# Patient Record
Sex: FEMALE | Race: WHITE | NOT HISPANIC OR LATINO | Employment: UNEMPLOYED | URBAN - METROPOLITAN AREA
[De-identification: names, ages, dates, MRNs, and addresses within clinical notes are randomized per-mention and may not be internally consistent; named-entity substitution may affect disease eponyms.]

---

## 2023-03-14 PROBLEM — J45.909 ASTHMA (HHS-HCC): Status: ACTIVE | Noted: 2023-03-14

## 2023-03-14 PROBLEM — R21 RASH: Status: ACTIVE | Noted: 2023-03-14

## 2023-03-14 PROBLEM — M67.40 GANGLION CYST: Status: ACTIVE | Noted: 2023-03-14

## 2023-03-14 PROBLEM — L03.90 CELLULITIS: Status: ACTIVE | Noted: 2023-03-14

## 2023-03-14 PROBLEM — M25.579 ANKLE PAIN: Status: ACTIVE | Noted: 2023-03-14

## 2023-03-14 PROBLEM — M25.579 ANKLE PAIN: Status: RESOLVED | Noted: 2023-03-14 | Resolved: 2023-03-14

## 2023-03-14 PROBLEM — H10.45 CHRONIC ALLERGIC CONJUNCTIVITIS: Status: ACTIVE | Noted: 2023-03-14

## 2023-03-14 PROBLEM — D23.9 DYSPLASTIC NEVI: Status: ACTIVE | Noted: 2023-03-14

## 2023-03-14 PROBLEM — F90.0 ATTENTION DEFICIT HYPERACTIVITY DISORDER (ADHD), PREDOMINANTLY INATTENTIVE TYPE: Status: ACTIVE | Noted: 2023-03-14

## 2023-03-14 PROBLEM — J30.9 ALLERGIC RHINITIS: Status: ACTIVE | Noted: 2023-03-14

## 2023-03-15 ENCOUNTER — OFFICE VISIT (OUTPATIENT)
Dept: PEDIATRICS | Facility: CLINIC | Age: 23
End: 2023-03-15
Payer: COMMERCIAL

## 2023-03-15 VITALS
BODY MASS INDEX: 26.51 KG/M2 | DIASTOLIC BLOOD PRESSURE: 72 MMHG | WEIGHT: 179 LBS | HEIGHT: 69 IN | SYSTOLIC BLOOD PRESSURE: 118 MMHG

## 2023-03-15 DIAGNOSIS — J40 BRONCHITIS: Primary | ICD-10-CM

## 2023-03-15 PROCEDURE — 99213 OFFICE O/P EST LOW 20 MIN: CPT | Performed by: PEDIATRICS

## 2023-03-15 RX ORDER — MUPIROCIN 20 MG/G
OINTMENT TOPICAL
COMMUNITY
Start: 2022-12-28

## 2023-03-15 RX ORDER — AZITHROMYCIN 250 MG/1
TABLET, FILM COATED ORAL
Qty: 6 TABLET | Refills: 0 | Status: SHIPPED | OUTPATIENT
Start: 2023-03-15 | End: 2023-03-20

## 2023-03-15 RX ORDER — ALBUTEROL SULFATE 90 UG/1
AEROSOL, METERED RESPIRATORY (INHALATION)
COMMUNITY
End: 2023-08-17 | Stop reason: WASHOUT

## 2023-03-15 RX ORDER — LISDEXAMFETAMINE DIMESYLATE 40 MG/1
40 CAPSULE ORAL
COMMUNITY
End: 2023-03-17 | Stop reason: SDUPTHER

## 2023-03-15 NOTE — PROGRESS NOTES
Had COVID 3 weeks ago. Feels like it is affecting her lungs. Coughing phlegm - green/brown. Taking mucinex.

## 2023-03-15 NOTE — PROGRESS NOTES
Subjective   Patient ID: Babak Fry is a 22 y.o. female who presents for Nasal Congestion and Cough.  HPI  Patient is here with self  She had covid three weeks ago. This was her second infection with covid. Since then she has been coughing up phelgm  There is no sore throat There is no ear pain There is no vomit nor diarrhea Urination is normal    Objective   Physical Exam  Constitutional:       Appearance: Normal appearance.   HENT:      Right Ear: Tympanic membrane normal.      Left Ear: Tympanic membrane normal.      Nose: Nose normal.      Mouth/Throat:      Mouth: Mucous membranes are moist.      Pharynx: Oropharynx is clear.   Cardiovascular:      Rate and Rhythm: Normal rate and regular rhythm.   Pulmonary:      Effort: Pulmonary effort is normal.      Breath sounds: Normal breath sounds.   Lymphadenopathy:      Cervical: No cervical adenopathy.   Skin:     General: Skin is warm.   Neurological:      Mental Status: She is alert.   Psychiatric:         Mood and Affect: Mood normal.         Assessment/Plan   Diagnoses and all orders for this visit:  Bronchitis  -     azithromycin (Zithromax) 250 mg tablet; Take 2 tablets (500 mg) by mouth once daily for 1 day, THEN 1 tablet (250 mg) once daily for 4 days.  You will take zithromax for five days. Please call back if you are not doing better by Monday   Return as needed

## 2023-03-17 ENCOUNTER — OFFICE VISIT (OUTPATIENT)
Dept: PEDIATRICS | Facility: CLINIC | Age: 23
End: 2023-03-17
Payer: COMMERCIAL

## 2023-03-17 VITALS
WEIGHT: 181 LBS | HEIGHT: 69 IN | SYSTOLIC BLOOD PRESSURE: 112 MMHG | BODY MASS INDEX: 26.81 KG/M2 | DIASTOLIC BLOOD PRESSURE: 78 MMHG

## 2023-03-17 DIAGNOSIS — F90.0 ATTENTION DEFICIT HYPERACTIVITY DISORDER (ADHD), PREDOMINANTLY INATTENTIVE TYPE: Primary | ICD-10-CM

## 2023-03-17 PROCEDURE — 99213 OFFICE O/P EST LOW 20 MIN: CPT | Performed by: PEDIATRICS

## 2023-03-17 RX ORDER — LISDEXAMFETAMINE DIMESYLATE 40 MG/1
40 CAPSULE ORAL EVERY MORNING
Qty: 30 CAPSULE | Refills: 0 | Status: SHIPPED | OUTPATIENT
Start: 2023-05-16 | End: 2023-06-30 | Stop reason: WASHOUT

## 2023-03-17 RX ORDER — LISDEXAMFETAMINE DIMESYLATE 40 MG/1
40 CAPSULE ORAL EVERY MORNING
Qty: 30 CAPSULE | Refills: 0 | Status: SHIPPED | OUTPATIENT
Start: 2023-04-16 | End: 2023-06-30 | Stop reason: WASHOUT

## 2023-03-17 RX ORDER — LISDEXAMFETAMINE DIMESYLATE 40 MG/1
40 CAPSULE ORAL
Qty: 30 CAPSULE | Refills: 0 | Status: SHIPPED | OUTPATIENT
Start: 2023-03-17 | End: 2023-06-30 | Stop reason: WASHOUT

## 2023-03-17 NOTE — PROGRESS NOTES
"Subjective   Patient ID: Babak Fry is a 22 y.o. female who presents for med check.  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  Working as  at the   [employer] - out from basketball for 1.5 years, decided she is now done, rather than playing 1 more year and possibly getting injured    Follow up ADHD med Vyvanse 40 mg  Takes med on school days or weekends if doing homework, otherwise no  Dose working well  Sleeping well 7-8 hrs  Appetite ok  Concerns - no    Controlled substance form on file 6/20/22  Had urine drug screen 12/22  Last well visit March 2022      Objective   Vitals:    03/17/23 0955   BP: 112/78   Weight: 82.1 kg (181 lb)   Height: 1.753 m (5' 9\")       Physical Exam  Constitutional:       General: She is not in acute distress.     Appearance: Normal appearance.   HENT:      Right Ear: Tympanic membrane normal.      Left Ear: Tympanic membrane normal.      Nose: Nose normal. No rhinorrhea.      Mouth/Throat:      Mouth: Mucous membranes are moist.      Pharynx: Oropharynx is clear. No posterior oropharyngeal erythema.   Eyes:      Conjunctiva/sclera: Conjunctivae normal.      Pupils: Pupils are equal, round, and reactive to light.   Cardiovascular:      Rate and Rhythm: Normal rate and regular rhythm.   Pulmonary:      Effort: Pulmonary effort is normal.      Breath sounds: Normal breath sounds.   Musculoskeletal:      Cervical back: Neck supple.   Lymphadenopathy:      Cervical: No cervical adenopathy.   Skin:     Findings: No rash.   Neurological:      Mental Status: She is alert.       Assessment/Plan   1. Attention deficit hyperactivity disorder (ADHD), predominantly inattentive type    - Vyvanse 40 mg capsule; Take 1 capsule (40 mg) by mouth once daily in the morning. Take before meals.  Dispense: 30 capsule; Refill: 0  - lisdexamfetamine (Vyvanse) 40 mg " capsule; Take 1 capsule (40 mg) by mouth once daily in the morning. Do not start before May 16, 2023.  Dispense: 30 capsule; Refill: 0  - lisdexamfetamine (Vyvanse) 40 mg capsule; Take 1 capsule (40 mg) by mouth once daily in the morning. Do not start before April 16, 2023.  Dispense: 30 capsule; Refill: 0     Chipia will schedule a well visit at her convenience, as she is due.

## 2023-06-29 DIAGNOSIS — F90.0 ATTENTION DEFICIT HYPERACTIVITY DISORDER (ADHD), PREDOMINANTLY INATTENTIVE TYPE: Primary | ICD-10-CM

## 2023-06-29 NOTE — TELEPHONE ENCOUNTER
from Babak, [phone].  Babak ran out of her medication and needs 3 rx's sent to the pharmacy.  Sees HA.

## 2023-06-29 NOTE — TELEPHONE ENCOUNTER
Last med check apt was 3/17/23; last wcc 3/1/22 w/HA - needs a wcc.      Spoke to Babak and she said she's on her way home from college now so we discussed that she does need a wcc.  Babak agrees to schedule an apt for that when we're done.  Said she takes vyvanse 40mg every day.  Per Babak, she has no side effects like h/a's, loss of appetite, weight loss or issues with sleeping.  Discussed that MORENO is back in the office tomorrow so will ask her to send rx then.  Babak understands plan and per DENNIS scheduled a wcc apt on 7/11/23.      I got 1 rx ready for you to send on Friday.  Then you can send the other 2 when you see her for her wcc.

## 2023-06-30 RX ORDER — LISDEXAMFETAMINE DIMESYLATE 40 MG/1
CAPSULE ORAL
Qty: 30 CAPSULE | Refills: 0 | Status: SHIPPED | OUTPATIENT
Start: 2023-06-30 | End: 2023-08-18 | Stop reason: WASHOUT

## 2023-08-15 ENCOUNTER — TELEPHONE (OUTPATIENT)
Dept: PEDIATRICS | Facility: CLINIC | Age: 23
End: 2023-08-15
Payer: COMMERCIAL

## 2023-08-15 NOTE — TELEPHONE ENCOUNTER
Last wcc 3/1/22 w/HA and last med check before the one that was missed on 7/11/23 was 3/17/23.  Left message on Babak's voice mail to call the  and schedule a wcc apt since she hasn't had one since last March.

## 2023-08-15 NOTE — TELEPHONE ENCOUNTER
from [facility], [phone].  She just realized she missed her apt last week and needs to schedule another before this Friday b/c she'll be going back to college and needs a refill of her medication.

## 2023-08-16 NOTE — TELEPHONE ENCOUNTER
Babak left another msg stating that she needs to schedule an apt before this Friday b/c she does need refills on her medication.  Will ask  to call patient to schedule the apt.

## 2023-08-17 ENCOUNTER — OFFICE VISIT (OUTPATIENT)
Dept: PEDIATRICS | Facility: CLINIC | Age: 23
End: 2023-08-17
Payer: COMMERCIAL

## 2023-08-17 DIAGNOSIS — F90.0 ATTENTION DEFICIT HYPERACTIVITY DISORDER (ADHD), PREDOMINANTLY INATTENTIVE TYPE: ICD-10-CM

## 2023-08-17 DIAGNOSIS — Z00.00 ENCOUNTER FOR WELLNESS EXAMINATION IN ADULT: Primary | ICD-10-CM

## 2023-08-17 DIAGNOSIS — F41.9 ANXIETY: ICD-10-CM

## 2023-08-17 DIAGNOSIS — Z13.31 SCREENING FOR DEPRESSION: ICD-10-CM

## 2023-08-17 PROBLEM — M25.571 ACUTE RIGHT ANKLE PAIN: Status: RESOLVED | Noted: 2021-06-26 | Resolved: 2023-08-17

## 2023-08-17 PROBLEM — B36.0 TINEA VERSICOLOR: Status: RESOLVED | Noted: 2023-08-17 | Resolved: 2023-08-17

## 2023-08-17 PROBLEM — S86.011A RUPTURE OF RIGHT ACHILLES TENDON: Status: RESOLVED | Noted: 2021-06-26 | Resolved: 2023-08-17

## 2023-08-17 PROBLEM — Z98.890 S/P ACHILLES TENDON REPAIR: Status: RESOLVED | Noted: 2021-10-21 | Resolved: 2023-08-17

## 2023-08-17 PROBLEM — R30.0 DYSURIA: Status: RESOLVED | Noted: 2023-08-17 | Resolved: 2023-08-17

## 2023-08-17 PROBLEM — S93.491A SPRAIN OF TALOFIBULAR LIGAMENT OF RIGHT ANKLE: Status: RESOLVED | Noted: 2023-08-17 | Resolved: 2023-08-17

## 2023-08-17 PROCEDURE — 99213 OFFICE O/P EST LOW 20 MIN: CPT | Performed by: PEDIATRICS

## 2023-08-17 PROCEDURE — 99395 PREV VISIT EST AGE 18-39: CPT | Performed by: PEDIATRICS

## 2023-08-17 PROCEDURE — 96127 BRIEF EMOTIONAL/BEHAV ASSMT: CPT | Performed by: PEDIATRICS

## 2023-08-17 NOTE — PROGRESS NOTES
Subjective   Patient ID: Babak Fry is a 22 y.o. female who presents for Follow-up (MED RECHECK).  HPI  Scheduled for med check, had appt next week for well visit. 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 At [employer]  Meds - Vyvanse 40 mg works well, no concerns, no side effects  Takes only for school or a full day of work  Hasn't needed albuterol in years  Allergies - `none  Diet - good variety of foods, +dairy, water  Exercise - done with basketball (after torn achilles tendon) but goes to gym every day  Allen - normal  Menstrual hx - regular, no significant problems  Sleep - normal, 7-8 hrs  Dental - brushes and sees dentist  Denies smoking, vaping, alcohol, illicit drugs  Sexual activity - no  Safety - wears seatbelt always    PHQ-9 Depression screen: normal, score = 0    Objective     Physical Exam  Constitutional:       Appearance: Normal appearance.   HENT:      Right Ear: Tympanic membrane and ear canal normal.      Left Ear: Tympanic membrane and ear canal normal.      Nose: Nose normal. No rhinorrhea.      Mouth/Throat:      Mouth: Mucous membranes are moist.      Pharynx: Oropharynx is clear.   Eyes:      Extraocular Movements: Extraocular movements intact.      Conjunctiva/sclera: Conjunctivae normal.      Pupils: Pupils are equal, round, and reactive to light.   Cardiovascular:      Rate and Rhythm: Normal rate and regular rhythm.   Pulmonary:      Effort: Pulmonary effort is normal.      Breath sounds: Normal breath sounds.   Abdominal:      Palpations: Abdomen is soft. There is no hepatomegaly, splenomegaly or mass.      Tenderness: There is no abdominal tenderness.   Genitourinary:     Comments: Normal female  genitalia  Musculoskeletal:         General: Normal range of motion.      Cervical back: Neck supple.      Comments: No significant scoliosis   Lymphadenopathy:      Cervical: No cervical adenopathy.   Skin:     Findings: No rash.   Neurological:      General: No focal deficit present.      Mental Status: She is alert.     Assessment/Plan   Diagnoses and all orders for this visit:  Encounter for wellness examination in adult  Babak is doing well and has a normal physical exam today.  Recommend GYN visit to establish routine care.  Also discussed transition to adult doctor at age 24.  Discussed importance of healthy variety in diet, regular physical exercise, adequate sleep, appropriate safety restraints in car.   Follow up for next well visit in 1 year, or sooner with any concerns.   Attention deficit hyperactivity disorder (ADHD), predominantly inattentive type  -     lisdexamfetamine (Vyvanse) 40 mg capsule; Take 1 capsule (40 mg) by mouth once daily in the morning.  -     lisdexamfetamine (Vyvanse) 40 mg capsule; Take 1 capsule (40 mg) by mouth once daily in the morning. Do not start before September 17, 2023.  -     lisdexamfetamine (Vyvanse) 40 mg capsule; Take 1 capsule (40 mg) by mouth once daily in the morning. Do not start before October 17, 2023.  - Follow up in office in 6 months, sooner if any questions or concerns.  Anxiety  To counselor - recommend cognitive behavioral therapy (CBT)  Will check with school for options, or call here if need assistance.

## 2023-08-18 PROBLEM — L03.90 CELLULITIS: Status: RESOLVED | Noted: 2023-03-14 | Resolved: 2023-08-18

## 2023-08-18 PROBLEM — Z98.890 S/P ACHILLES TENDON REPAIR: Status: ACTIVE | Noted: 2021-10-21

## 2023-08-18 PROBLEM — R21 RASH: Status: RESOLVED | Noted: 2023-03-14 | Resolved: 2023-08-18

## 2023-08-18 PROBLEM — J45.909 ASTHMA (HHS-HCC): Status: RESOLVED | Noted: 2023-03-14 | Resolved: 2023-08-18

## 2023-08-18 RX ORDER — LISDEXAMFETAMINE DIMESYLATE 40 MG/1
40 CAPSULE ORAL EVERY MORNING
Qty: 30 CAPSULE | Refills: 0 | Status: SHIPPED | OUTPATIENT
Start: 2023-08-18 | End: 2023-11-28 | Stop reason: WASHOUT

## 2023-08-18 RX ORDER — LISDEXAMFETAMINE DIMESYLATE 40 MG/1
40 CAPSULE ORAL EVERY MORNING
Qty: 30 CAPSULE | Refills: 0 | Status: SHIPPED | OUTPATIENT
Start: 2023-09-17 | End: 2023-11-28 | Stop reason: WASHOUT

## 2023-08-18 RX ORDER — LISDEXAMFETAMINE DIMESYLATE 40 MG/1
40 CAPSULE ORAL EVERY MORNING
Qty: 30 CAPSULE | Refills: 0 | Status: SHIPPED | OUTPATIENT
Start: 2023-10-17 | End: 2023-11-28 | Stop reason: WASHOUT

## 2023-08-28 ENCOUNTER — APPOINTMENT (OUTPATIENT)
Dept: PEDIATRICS | Facility: CLINIC | Age: 23
End: 2023-08-28
Payer: COMMERCIAL

## 2023-10-13 ENCOUNTER — TELEPHONE (OUTPATIENT)
Dept: PEDIATRICS | Facility: CLINIC | Age: 23
End: 2023-10-13
Payer: COMMERCIAL

## 2023-10-13 NOTE — TELEPHONE ENCOUNTER
Spoke with Babak. She is having a sore throat and is wondering if she should be seen. I discussed that I recommend an apt for a poss strep test. Babak has an apt scheduled for the flu clinic on Monday 10/16, I discussed with her that we would not be able to do a strep test at that visit as she would not be seeing a physician and also would not be able to get a flu shot if she is sick. We have no apts today, and she is unavailable until after 12pm tomorrow, so unable to come to office until Monday. I discussed with her that it is up to her if she wants to go to urgent care or wait to be seen until Monday. She would like to schedule an apt here for Monday.  to schedule an apt.

## 2023-10-14 ENCOUNTER — APPOINTMENT (OUTPATIENT)
Dept: PEDIATRICS | Facility: CLINIC | Age: 23
End: 2023-10-14
Payer: COMMERCIAL

## 2023-10-19 ENCOUNTER — CLINICAL SUPPORT (OUTPATIENT)
Dept: PEDIATRICS | Facility: CLINIC | Age: 23
End: 2023-10-19
Payer: COMMERCIAL

## 2023-10-19 DIAGNOSIS — Z23 ENCOUNTER FOR IMMUNIZATION: ICD-10-CM

## 2023-10-19 PROCEDURE — 90686 IIV4 VACC NO PRSV 0.5 ML IM: CPT | Performed by: PEDIATRICS

## 2023-10-19 PROCEDURE — 90471 IMMUNIZATION ADMIN: CPT | Performed by: PEDIATRICS

## 2023-10-19 NOTE — PROGRESS NOTES
Here for flu vaccine, insurance verified, Flu screening questions reviewed (all no) by me  to scan in chart. VIS given

## 2023-11-28 DIAGNOSIS — F90.0 ATTENTION DEFICIT HYPERACTIVITY DISORDER (ADHD), PREDOMINANTLY INATTENTIVE TYPE: ICD-10-CM

## 2023-11-28 RX ORDER — LISDEXAMFETAMINE DIMESYLATE 40 MG/1
CAPSULE ORAL
Qty: 30 CAPSULE | Refills: 0 | Status: SHIPPED | OUTPATIENT
Start: 2024-01-27 | End: 2024-01-15 | Stop reason: WASHOUT

## 2023-11-28 RX ORDER — LISDEXAMFETAMINE DIMESYLATE 40 MG/1
CAPSULE ORAL
Qty: 30 CAPSULE | Refills: 0 | Status: SHIPPED | OUTPATIENT
Start: 2023-11-28 | End: 2024-01-15 | Stop reason: WASHOUT

## 2023-11-28 RX ORDER — LISDEXAMFETAMINE DIMESYLATE 40 MG/1
CAPSULE ORAL
Qty: 30 CAPSULE | Refills: 0 | Status: SHIPPED | OUTPATIENT
Start: 2023-12-28 | End: 2024-01-15 | Stop reason: WASHOUT

## 2023-11-28 NOTE — TELEPHONE ENCOUNTER
Last follow up med check/wcc  8/17/23 w/HA and 3 rx's were sent for vyvanse 40mg at this apt.   Pt to follow up in 6 months.      Per Babak, she has no side effects when taking her medication.  Said she's home so can send rx's to pharmacy here.  Said she has 5 doses left and knows she needs to be seen in 3 months.      3 Rx's ready for vyvanse 40mg ready to be authorized.

## 2023-11-29 NOTE — TELEPHONE ENCOUNTER
Notified Babak that 3 rx's were sent to their pharmacy.  Babak understands and has no other questions.

## 2024-01-10 ENCOUNTER — OFFICE VISIT (OUTPATIENT)
Dept: PEDIATRICS | Facility: CLINIC | Age: 24
End: 2024-01-10
Payer: COMMERCIAL

## 2024-01-10 VITALS
WEIGHT: 147 LBS | BODY MASS INDEX: 21.77 KG/M2 | SYSTOLIC BLOOD PRESSURE: 114 MMHG | DIASTOLIC BLOOD PRESSURE: 74 MMHG | HEIGHT: 69 IN

## 2024-01-10 DIAGNOSIS — R63.4 WEIGHT LOSS: ICD-10-CM

## 2024-01-10 DIAGNOSIS — F90.0 ATTENTION DEFICIT HYPERACTIVITY DISORDER (ADHD), PREDOMINANTLY INATTENTIVE TYPE: Primary | ICD-10-CM

## 2024-01-10 PROCEDURE — 99213 OFFICE O/P EST LOW 20 MIN: CPT | Performed by: PEDIATRICS

## 2024-01-10 NOTE — PROGRESS NOTES
"Subjective   Patient ID: Babak Fry is a 23 y.o. female who presents for med check (Here by self ).  HPI  History provided by patient     Doing well on Vyvanse 40 mg  College is going well  Has lost 34 lbs  Wanted to get more lean  Was on a 1200 mitch/day diet to lose fat - proteins, veggies, low carb  Exercises 2-3 hours every day  Is very disciplined  2 weeks late on period and no possibilityi of pregnancy, just got her period back  Now realizes she went a bit too far, starting to eat more    Objective   Vitals:    01/10/24 1631   BP: 114/74   Weight: 66.7 kg (147 lb)   Height: 1.74 m (5' 8.5\")      Physical Exam  Constitutional:       General: She is not in acute distress.     Comments: Thin appearing   HENT:      Right Ear: Tympanic membrane normal.      Left Ear: Tympanic membrane normal.      Nose: Nose normal. No rhinorrhea.      Mouth/Throat:      Mouth: Mucous membranes are moist.      Pharynx: Oropharynx is clear. No posterior oropharyngeal erythema.   Eyes:      Conjunctiva/sclera: Conjunctivae normal.      Pupils: Pupils are equal, round, and reactive to light.   Cardiovascular:      Rate and Rhythm: Normal rate and regular rhythm.   Pulmonary:      Effort: Pulmonary effort is normal.      Breath sounds: Normal breath sounds.   Musculoskeletal:      Cervical back: Neck supple.   Lymphadenopathy:      Cervical: No cervical adenopathy.   Skin:     Findings: No rash.   Neurological:      Mental Status: She is alert.       Assessment/Plan   Diagnoses and all orders for this visit:  Attention deficit hyperactivity disorder (ADHD), predominantly inattentive type  -     lisdexamfetamine (Vyvanse) 40 mg capsule; Take 1 capsule (40 mg) by mouth once daily in the morning. Do not start before February 25, 2024.  -     lisdexamfetamine (Vyvanse) 40 mg capsule; Take 1 capsule (40 mg) by mouth once daily in the morning. Do not start before March 26, 2024.  -     lisdexamfetamine (Vyvanse) 40 mg capsule; Take 1 capsule " (40 mg) by mouth once daily in the morning. Do not start before April 25, 2024.  Weight loss  Eat more calories; aim for 1584-3770 calories daily due to significant exercise.  Follow up in 3 months

## 2024-01-15 RX ORDER — LISDEXAMFETAMINE DIMESYLATE 40 MG/1
40 CAPSULE ORAL EVERY MORNING
Qty: 30 CAPSULE | Refills: 0 | Status: SHIPPED | OUTPATIENT
Start: 2024-04-25 | End: 2024-05-25

## 2024-01-15 RX ORDER — LISDEXAMFETAMINE DIMESYLATE 40 MG/1
40 CAPSULE ORAL EVERY MORNING
Qty: 30 CAPSULE | Refills: 0 | Status: SHIPPED | OUTPATIENT
Start: 2024-02-25 | End: 2024-03-26

## 2024-01-15 RX ORDER — LISDEXAMFETAMINE DIMESYLATE 40 MG/1
40 CAPSULE ORAL EVERY MORNING
Qty: 30 CAPSULE | Refills: 0 | Status: SHIPPED | OUTPATIENT
Start: 2024-03-26 | End: 2024-04-25

## 2024-01-18 DIAGNOSIS — F90.0 ATTENTION DEFICIT HYPERACTIVITY DISORDER (ADHD), PREDOMINANTLY INATTENTIVE TYPE: ICD-10-CM

## 2024-01-18 NOTE — TELEPHONE ENCOUNTER
Reviewed 1/10/24 visit note and HA sent 3 rx's with do not fill dates for 2/25/24, 3/26/24 and 4/25/24.  Spoke to Babak and she said she has about 7 doses of vyvanse 40mg left.  Discussed that MORENO is back in the office tomorrow so will ask her about this and let Babak know when rx is sent.      Notified mom that I spoke to Babak and mom said that her insurance only covers brand so wanted to make sure the rx's are for the brand.  Please advise.

## 2024-01-18 NOTE — TELEPHONE ENCOUNTER
Msg from mom, Kenji, [phone].  Mom went to the pharmacy to  Babak's rx but was unable to pick it up b/c the 3 rx's that were sent to the pharmacy have future dates and she needs a rx for January.  Babak's number is [phone].

## 2024-01-19 RX ORDER — LISDEXAMFETAMINE DIMESYLATE 40 MG/1
CAPSULE ORAL
Qty: 30 CAPSULE | Refills: 0 | Status: SHIPPED | OUTPATIENT
Start: 2024-01-24

## 2024-01-19 NOTE — TELEPHONE ENCOUNTER
Notified family that rx for January was sent to their pharmacy.  Parent understands plan and has no other questions.

## 2024-01-19 NOTE — TELEPHONE ENCOUNTER
Last wcc apt 8/17/23 and 3 rx's were sent for vyvanse 40mg at that apt and pt was to follow up in 6 months    Babak called for refills on 11/28/23 and said she had 5 doses left at that time.  Three rx's for vyvanse 40mg were sent to the local pharmacy then.  Checked chart and rx that was not to be filled before 1/27/24 was cancelled by HA and pharmacy approved cancellation on 1/15/24.      Last med check 1/10/24 and 3 rx's were sent for vyvanse 40mg - first one to be filled 2/25/24.   Follow up in 3 months was recommended.      Called [facility] and confirmed w/pharmacist, Palmira, that the next available rx to be filled isn't available until 2/25/24.    I got one rx ready to be filled in 6 days.  Do you want me to have the pharmacy cancel the rx for April?

## 2024-06-25 DIAGNOSIS — F90.0 ATTENTION DEFICIT HYPERACTIVITY DISORDER (ADHD), PREDOMINANTLY INATTENTIVE TYPE: ICD-10-CM

## 2024-06-25 RX ORDER — LISDEXAMFETAMINE DIMESYLATE 40 MG/1
CAPSULE ORAL
Qty: 30 CAPSULE | Refills: 0 | Status: SHIPPED | OUTPATIENT
Start: 2024-06-25

## 2024-06-25 NOTE — TELEPHONE ENCOUNTER
Babak, [phone], called and said she needs a refill of vyvanse 40mg [facility] [place].   She has 3 or 4 doses left.  She usually takes it daily when she's in college, and only takes it when she works when she's not in school.  She has no side effects from this medication and has been stable on vyvanse 40mg for a long time.       Last wcc 8/17/23 w/HA.  Last med check 1/10/24 w/HA.  Discussed that MORENO is on vacation until mid July so discussed that I would ask one of the doctors that are here today to send 1 rx to the pharmacy and ask HA to send other 2 when she returns from vacation.  Would you be able to check OARRS and send 1 rx to pt's pharmacy?  I did get 1 rx ready.

## 2024-06-25 NOTE — TELEPHONE ENCOUNTER
Notified Babak that LF sent 1 rx to her pharmacy and that HA will send the other 2 rx's when she comes back from vacation.  I got 2 more rx's ready.  Does she need to have a urine drug screen done HA?

## 2024-06-25 NOTE — TELEPHONE ENCOUNTER
I reviewed her OARRS and will only refill one month of her ADHD medication. Per Dr. Bustillo's last note in January 2024 Babak was supposed to follow up with her in 3 months which she did not do I think because she has had significant weight loss in the past year. Please have patient schedule ADHD medication follow up with Dr. Bustillo when she returns from being out of the office for this. I think she also needs a yearly urine tox screen since she is over 20 and has not had one since December 2022.

## 2024-07-09 RX ORDER — LISDEXAMFETAMINE DIMESYLATE 40 MG/1
CAPSULE ORAL
Qty: 30 CAPSULE | Refills: 0 | Status: CANCELLED | OUTPATIENT
Start: 2024-08-24

## 2024-07-09 RX ORDER — LISDEXAMFETAMINE DIMESYLATE 40 MG/1
CAPSULE ORAL
Qty: 30 CAPSULE | Refills: 0 | Status: CANCELLED | OUTPATIENT
Start: 2024-07-25

## 2024-07-09 NOTE — TELEPHONE ENCOUNTER
Future labs ordered.   Spoke to Babak about having lab work done before the next rx for vyvanse will be sent to the pharmacy and that MORENO recommends a follow up weight check apt here.  Chipia agreed to schedule the apt and asked if instead of going to a  lab to have the lab work done can she just give the urine sample here when she comes for the weight check apt.  Discussed that this should be fine.  Will let MORENO know and transferred call to the front so apt can be scheduled.  Bbaak said that she's gaining weight nicely.  FYI and can sign encounter to close.

## 2024-07-15 NOTE — TELEPHONE ENCOUNTER
The 2 rx's will stay in my basket until they're sent and since she's not coming until 7/23 I cancelled them.  Your note from 7/9/24 is incomplete and needs to be signed so I can close.  FYI and can sign encounter to close.

## 2024-07-23 ENCOUNTER — APPOINTMENT (OUTPATIENT)
Dept: PEDIATRICS | Facility: CLINIC | Age: 24
End: 2024-07-23
Payer: COMMERCIAL

## 2024-07-23 VITALS
HEIGHT: 69 IN | BODY MASS INDEX: 22.81 KG/M2 | SYSTOLIC BLOOD PRESSURE: 116 MMHG | DIASTOLIC BLOOD PRESSURE: 78 MMHG | WEIGHT: 154 LBS

## 2024-07-23 DIAGNOSIS — F90.0 ATTENTION DEFICIT HYPERACTIVITY DISORDER (ADHD), PREDOMINANTLY INATTENTIVE TYPE: ICD-10-CM

## 2024-07-23 PROCEDURE — 80324 DRUG SCREEN AMPHETAMINES 1/2: CPT

## 2024-07-23 PROCEDURE — 99213 OFFICE O/P EST LOW 20 MIN: CPT | Performed by: PEDIATRICS

## 2024-07-23 PROCEDURE — 80307 DRUG TEST PRSMV CHEM ANLYZR: CPT

## 2024-07-23 PROCEDURE — 3008F BODY MASS INDEX DOCD: CPT | Performed by: PEDIATRICS

## 2024-07-23 NOTE — PROGRESS NOTES
"Subjective   Patient ID: Babak Fry is a 23 y.o. female who presents for med check (Here by self /Urine tox done /CSA signed ).  HPI  History provided by patient     Here for follow up ADHD medication - takes Vyvanse 40 mg  Got job as  at [employer]  Thought about doing body building  someone suggested.  She lost more weight to 140 lbs, even lost per period for a bit. decided she didn't like the way she looked (too thin) or felt (decreased energy), so stopped  Started eating more again and feels so much better, is happier  Still goes to gym but not as obsessively  Would like to stay around 160.  [employer] closed, so Finishing degree at [employer]  Has 2 more classes  Going to [place] 8/5-29 with mom for vacation to see family - excited  Has not found an adult doctor yet    Objective   Vitals:    07/23/24 0853   BP: 116/78   Weight: 69.9 kg (154 lb)   Height: 1.753 m (5' 9\")      Physical Exam  Constitutional:       General: She is not in acute distress.     Appearance: Normal appearance.   HENT:      Right Ear: Tympanic membrane normal.      Left Ear: Tympanic membrane normal.      Nose: Nose normal. No rhinorrhea.      Mouth/Throat:      Mouth: Mucous membranes are moist.      Pharynx: Oropharynx is clear. No posterior oropharyngeal erythema.   Eyes:      Conjunctiva/sclera: Conjunctivae normal.      Pupils: Pupils are equal, round, and reactive to light.   Cardiovascular:      Rate and Rhythm: Normal rate and regular rhythm.   Pulmonary:      Effort: Pulmonary effort is normal.      Breath sounds: Normal breath sounds.   Musculoskeletal:      Cervical back: Neck supple.   Lymphadenopathy:      Cervical: No cervical adenopathy.   Skin:     Findings: No rash.   Neurological:      Mental Status: She is alert.       Assessment/Plan   Diagnoses and all orders for this visit:  Attention deficit hyperactivity disorder (ADHD), predominantly inattentive type  -     Drug Screen, Urine With " Reflex to Confirmation  -     Amphetamine Confirm, Urine  -     lisdexamfetamine (Vyvanse) 40 mg capsule; Take 1 capsule (40 mg) by mouth once daily in the morning.  -     lisdexamfetamine (Vyvanse) 40 mg capsule; Take 1 capsule (40 mg) by mouth once daily in the morning. Do not fill before August 25, 2024.  -     lisdexamfetamine (Vyvanse) 40 mg capsule; Take 1 capsule (40 mg) by mouth once daily in the morning. Do not fill before September 24, 2024.    Babak is doing well on the current dose of medication for ADHD.  3 months of prescriptions were sent to the pharmacy.  Will transition to adult doctor - encouraged to call now to schedule appt.  Call with any questions prior to transition.    It has been an honor and a true pleasure to be your doctor for all these years.  I wish you all the success and happiness going forward in whatever path you choose.

## 2024-07-24 LAB
AMPHETAMINES UR QL SCN: NORMAL
BARBITURATES UR QL SCN: NORMAL
BENZODIAZ UR QL SCN: NORMAL
BZE UR QL SCN: NORMAL
CANNABINOIDS UR QL SCN: NORMAL
FENTANYL+NORFENTANYL UR QL SCN: NORMAL
METHADONE UR QL SCN: NORMAL
OPIATES UR QL SCN: NORMAL
OXYCODONE+OXYMORPHONE UR QL SCN: NORMAL
PCP UR QL SCN: NORMAL

## 2024-07-25 ENCOUNTER — TELEPHONE (OUTPATIENT)
Dept: PEDIATRICS | Facility: CLINIC | Age: 24
End: 2024-07-25
Payer: COMMERCIAL

## 2024-07-25 DIAGNOSIS — F90.0 ATTENTION DEFICIT HYPERACTIVITY DISORDER (ADHD), PREDOMINANTLY INATTENTIVE TYPE: ICD-10-CM

## 2024-07-25 NOTE — TELEPHONE ENCOUNTER
Pt seen on 7/23/24 for weight check and urine drug screen for adhd medication.  Results of urine drug screen on chart and is presumptive positive for amphetamine and confirmation test is still pending.

## 2024-07-26 RX ORDER — LISDEXAMFETAMINE DIMESYLATE 40 MG/1
40 CAPSULE ORAL EVERY MORNING
Qty: 30 CAPSULE | Refills: 0 | Status: SHIPPED | OUTPATIENT
Start: 2024-08-25 | End: 2024-09-24

## 2024-07-26 RX ORDER — LISDEXAMFETAMINE DIMESYLATE 40 MG/1
40 CAPSULE ORAL EVERY MORNING
Qty: 30 CAPSULE | Refills: 0 | Status: SHIPPED | OUTPATIENT
Start: 2024-07-26 | End: 2024-08-25

## 2024-07-26 RX ORDER — LISDEXAMFETAMINE DIMESYLATE 40 MG/1
40 CAPSULE ORAL EVERY MORNING
Qty: 30 CAPSULE | Refills: 0 | Status: SHIPPED | OUTPATIENT
Start: 2024-09-24 | End: 2024-10-24

## 2024-07-26 NOTE — TELEPHONE ENCOUNTER
Amphetamine confirm test still pending, however urine drug screen confirms that she's taking medication and HA sent 3 rx's to the pharmacy this morning.

## 2024-07-30 NOTE — TELEPHONE ENCOUNTER
Confirm test still pending.  Checked with client lab services and Jessica said turn around time is 10 days.

## 2024-07-31 LAB
AMPHET UR-MCNC: 72 NG/ML
MDA UR-MCNC: <200 NG/ML
MDEA UR-MCNC: <200 NG/ML
MDMA UR-MCNC: <200 NG/ML
METHAMPHET UR-MCNC: <200 NG/ML
PHENTERMINE UR CFM-MCNC: <200 NG/ML

## 2024-08-01 NOTE — TELEPHONE ENCOUNTER
Confirm test results are final and the level of medication in urine is very low.  To you MORENO for interpretation and advice.

## 2024-08-02 NOTE — TELEPHONE ENCOUNTER
Discussed w/HA and she said Babak will have to repeat the urine drug screen.  Lab work reordered.       Discussed urine drug screen results with Babak and that TIFFANIE recommends that she repeat the test.  Babak said she's taking the medication but understands plan and will go back to the lab.  FYI HA - I'll close this and will have to send results to [facility] since I'll be on vacation starting next Tuesday.

## 2024-08-05 ENCOUNTER — TELEPHONE (OUTPATIENT)
Dept: PEDIATRICS | Facility: CLINIC | Age: 24
End: 2024-08-05
Payer: COMMERCIAL

## 2024-08-05 NOTE — TELEPHONE ENCOUNTER
Babak said she's been so busy with work and getting ready for vacation that she hasn't been able to go to the lab yet.  She said she and her family will be leaving for [place] today and won't be back until 8/29/24.  She finds it odd that so little of her medication was in her urine b/c she's been taking it every day.  She did say that she drinks 1.5 gallons of water every day so is wondering if that could be the reason it didn't show up.  Discussed that it's possible.  Babak asked if she could drop off a urine sample here when she comes back from [place] and I told her that would be fine.  FYI and can sign encounter to close.

## 2024-09-26 ENCOUNTER — TELEPHONE (OUTPATIENT)
Dept: PEDIATRICS | Facility: CLINIC | Age: 24
End: 2024-09-26
Payer: COMMERCIAL

## 2024-09-26 NOTE — TELEPHONE ENCOUNTER
Babak and I spoke on 8/5/24 about her needing to drop off another urine sample for urine drug screen and she said she was super busy and was going on vacation and wouldn't be able to drop off a urine sample until she comes back from vacation on 8/29/24.  She would turn 24 while on vacation and MORENO was okay with her dropping urine sample off then.  That was a month ago.     Spoke to Babak and she said that she totally forgot to drop off the urine sample until her mom told her today that she scheduled an apt for her with her new doctor and that reminded Babak that she never dropped off the urine sample.  She is aware that she has one more rx for vyvanse 40mg at the pharmacy and said she scheduled the apt w/her new doctor in October.  Discussed that I'd ask MORENO if Babak should come to the office now even though she's 23 y/o and give a urine sample here and get back to her tomorrow.  Babak understands plan.  Please advise.

## 2024-09-26 NOTE — TELEPHONE ENCOUNTER
from [facility], [phone].  She has to redo a urine sample and she's wondering if she could come to the office and give the sample here.

## 2024-09-27 NOTE — TELEPHONE ENCOUNTER
Discussed w/HA and she said she doesn't need to know retroactively that Babak is taking her medication and she doesn't need a urine sample anymore.  She can discuss her medication with her new provider.    Discussed the above with Babak and she understands plan and has no other questions.

## 2024-12-10 ENCOUNTER — OFFICE VISIT (OUTPATIENT)
Dept: URGENT CARE | Age: 24
End: 2024-12-10

## 2024-12-10 VITALS
HEART RATE: 93 BPM | DIASTOLIC BLOOD PRESSURE: 69 MMHG | SYSTOLIC BLOOD PRESSURE: 119 MMHG | WEIGHT: 154 LBS | OXYGEN SATURATION: 96 % | HEIGHT: 70 IN | RESPIRATION RATE: 20 BRPM | TEMPERATURE: 98 F | BODY MASS INDEX: 22.05 KG/M2

## 2024-12-10 DIAGNOSIS — S60.221A CONTUSION OF RIGHT HAND, INITIAL ENCOUNTER: Primary | ICD-10-CM

## 2024-12-10 PROCEDURE — 3008F BODY MASS INDEX DOCD: CPT | Performed by: SURGERY

## 2024-12-10 PROCEDURE — 99213 OFFICE O/P EST LOW 20 MIN: CPT | Performed by: SURGERY

## 2024-12-11 ENCOUNTER — ANCILLARY PROCEDURE (OUTPATIENT)
Dept: URGENT CARE | Age: 24
End: 2024-12-11
Payer: COMMERCIAL

## 2024-12-11 PROCEDURE — 73130 X-RAY EXAM OF HAND: CPT | Mod: RIGHT SIDE | Performed by: SURGERY

## 2024-12-13 NOTE — PROGRESS NOTES
Chief Complaint   Patient presents with    Injury     Right hand, right knuckle pain due to falling.       Physical Exam R hand:     Skin: ecchymosis 3rd MCP  Swelling: 3rd MCP  Deformity: None  Tenderness: 3rd MCP  ROM: unlinited  Joint effusion: None    Imaging:       === 12/10/24 ===    XR HAND 3+ VIEWS RIGHT    - Impression -  No evidence of fracture.    MACRO:  None.    Signed by: Nita Sloan 12/11/2024 9:31 AM  Dictation workstation:   [id number]    Assessment:     Encounter Diagnosis   Name Primary?    Contusion of right hand, initial encounter Yes          Medical Decision Making & Plan:     For pain:   -Take 1000 mg of Tylenol with 800 mg ibuprofen every 6-8 hours. These work better when taken at the same time.      -lidocaine patches     -Rest, Ice (20 min on 20 min off for first 48 hours then change to heat), Compression (For comfort/to reduce swelling), Elevation (Helps reduce swelling).            12/12/24 at 7:38 PM - Gill Schmitt, DO